# Patient Record
Sex: MALE | Race: WHITE | NOT HISPANIC OR LATINO | URBAN - METROPOLITAN AREA
[De-identification: names, ages, dates, MRNs, and addresses within clinical notes are randomized per-mention and may not be internally consistent; named-entity substitution may affect disease eponyms.]

---

## 2017-02-14 ENCOUNTER — IMPORTED ENCOUNTER (OUTPATIENT)
Dept: URBAN - METROPOLITAN AREA CLINIC 50 | Facility: CLINIC | Age: 77
End: 2017-02-14

## 2017-05-16 ENCOUNTER — IMPORTED ENCOUNTER (OUTPATIENT)
Dept: URBAN - METROPOLITAN AREA CLINIC 50 | Facility: CLINIC | Age: 77
End: 2017-05-16

## 2017-11-21 ENCOUNTER — IMPORTED ENCOUNTER (OUTPATIENT)
Dept: URBAN - METROPOLITAN AREA CLINIC 50 | Facility: CLINIC | Age: 77
End: 2017-11-21

## 2017-11-21 NOTE — PATIENT DISCUSSION
"""Continue Artificial tears both eyes two - four times a day. "" ""Continue Zaditor both eyes twice a day. PRN"" ""Continue Cool Compresses both eyes as needed.  """

## 2018-03-01 ENCOUNTER — IMPORTED ENCOUNTER (OUTPATIENT)
Dept: URBAN - METROPOLITAN AREA CLINIC 50 | Facility: CLINIC | Age: 78
End: 2018-03-01

## 2018-05-29 ENCOUNTER — IMPORTED ENCOUNTER (OUTPATIENT)
Dept: URBAN - METROPOLITAN AREA CLINIC 50 | Facility: CLINIC | Age: 78
End: 2018-05-29

## 2018-11-20 ENCOUNTER — IMPORTED ENCOUNTER (OUTPATIENT)
Dept: URBAN - METROPOLITAN AREA CLINIC 50 | Facility: CLINIC | Age: 78
End: 2018-11-20

## 2019-07-01 ENCOUNTER — IMPORTED ENCOUNTER (OUTPATIENT)
Dept: URBAN - METROPOLITAN AREA CLINIC 50 | Facility: CLINIC | Age: 79
End: 2019-07-01

## 2019-07-02 ENCOUNTER — IMPORTED ENCOUNTER (OUTPATIENT)
Dept: URBAN - METROPOLITAN AREA CLINIC 50 | Facility: CLINIC | Age: 79
End: 2019-07-02

## 2019-08-01 ENCOUNTER — IMPORTED ENCOUNTER (OUTPATIENT)
Dept: URBAN - METROPOLITAN AREA CLINIC 50 | Facility: CLINIC | Age: 79
End: 2019-08-01

## 2020-01-02 ENCOUNTER — IMPORTED ENCOUNTER (OUTPATIENT)
Dept: URBAN - METROPOLITAN AREA CLINIC 50 | Facility: CLINIC | Age: 80
End: 2020-01-02

## 2020-06-16 ENCOUNTER — IMPORTED ENCOUNTER (OUTPATIENT)
Dept: URBAN - METROPOLITAN AREA CLINIC 50 | Facility: CLINIC | Age: 80
End: 2020-06-16

## 2020-12-16 ENCOUNTER — IMPORTED ENCOUNTER (OUTPATIENT)
Dept: URBAN - METROPOLITAN AREA CLINIC 50 | Facility: CLINIC | Age: 80
End: 2020-12-16

## 2020-12-16 NOTE — PATIENT DISCUSSION
"""Continue Artificial tears both eyes twice a day ."" ""Continue allergy drops both eyes as needed . """

## 2021-03-01 ENCOUNTER — IMPORTED ENCOUNTER (OUTPATIENT)
Dept: URBAN - METROPOLITAN AREA CLINIC 50 | Facility: CLINIC | Age: 81
End: 2021-03-01

## 2021-04-18 ASSESSMENT — VISUAL ACUITY
OD_CC: J1+
OD_CC: 20/30
OD_CC: 20/40-
OD_CC: 20/50
OS_CC: J2
OS_CC: 20/25-
OS_CC: J1+@ 16 IN
OD_CC: J2
OD_CC: J1+
OD_CC: J1+@ 16 IN
OS_CC: J1+
OD_CC: 20/25-2
OS_CC: 20/25-
OD_PH: 20/30
OD_CC: 20/50
OS_CC: 20/25
OS_CC: 20/30-
OS_CC: 20/40
OS_CC: 20/30
OS_CC: J1
OD_CC: J1
OD_CC: 20/60
OS_CC: J2@ 16 IN
OS_CC: 20/25-
OD_CC: J1
OD_CC: 20/30
OD_CC: J2@ 16 IN
OS_CC: J1
OS_CC: 20/30
OS_CC: 20/25
OS_CC: J1+

## 2021-04-18 ASSESSMENT — TONOMETRY
OD_IOP_MMHG: 20
OD_IOP_MMHG: 20
OD_IOP_MMHG: 18
OS_IOP_MMHG: 18
OS_IOP_MMHG: 19
OS_IOP_MMHG: 18
OD_IOP_MMHG: 20
OS_IOP_MMHG: 20
OS_IOP_MMHG: 23
OD_IOP_MMHG: 18
OD_IOP_MMHG: 20
OS_IOP_MMHG: 18
OS_IOP_MMHG: 20
OD_IOP_MMHG: 20
OS_IOP_MMHG: 19
OS_IOP_MMHG: 18

## 2021-09-02 ENCOUNTER — PROBLEM (OUTPATIENT)
Dept: URBAN - METROPOLITAN AREA CLINIC 49 | Facility: CLINIC | Age: 81
End: 2021-09-02

## 2021-09-02 DIAGNOSIS — H53.8: ICD-10-CM

## 2021-09-02 DIAGNOSIS — H35.3232: ICD-10-CM

## 2021-09-02 DIAGNOSIS — H04.123: ICD-10-CM

## 2021-09-02 DIAGNOSIS — H35.373: ICD-10-CM

## 2021-09-02 DIAGNOSIS — H43.813: ICD-10-CM

## 2021-09-02 PROCEDURE — 92012 INTRM OPH EXAM EST PATIENT: CPT

## 2021-09-02 PROCEDURE — 92134 CPTRZ OPH DX IMG PST SGM RTA: CPT

## 2021-09-02 ASSESSMENT — VISUAL ACUITY
OD_CC: 20/60 ECC
OS_CC: 20/40+2

## 2021-09-02 ASSESSMENT — TONOMETRY
OS_IOP_MMHG: 18
OD_IOP_MMHG: 18

## 2021-09-02 NOTE — PATIENT DISCUSSION
Stable. Discussed AMD and  risk of vision loss. Discussed AREDS 2 vitamins, eating dark green leafy vegetables, wearing UV sunglasses, and BP control. Patient to call if vision distorts/blurs or any [place]ms[place] grid changes.

## 2021-09-02 NOTE — PATIENT DISCUSSION
recommend patient have hvf 30-2 soon to see if there is any defect from stroke or something else like glaucoma.

## 2021-09-03 ENCOUNTER — DIAGNOSTICS - HVF (OUTPATIENT)
Dept: URBAN - METROPOLITAN AREA CLINIC 49 | Facility: CLINIC | Age: 81
End: 2021-09-03

## 2021-09-03 DIAGNOSIS — H40.052: ICD-10-CM

## 2021-09-03 PROCEDURE — 92083 EXTENDED VISUAL FIELD XM: CPT

## 2021-09-03 NOTE — PATIENT DISCUSSION
discussed with patient that he needs to continue with [facility] for treatment.

## 2023-04-06 ENCOUNTER — COMPREHENSIVE EXAM (OUTPATIENT)
Dept: URBAN - METROPOLITAN AREA CLINIC 49 | Facility: CLINIC | Age: 83
End: 2023-04-06

## 2023-04-06 DIAGNOSIS — H04.123: ICD-10-CM

## 2023-04-06 DIAGNOSIS — H43.813: ICD-10-CM

## 2023-04-06 DIAGNOSIS — H35.3232: ICD-10-CM

## 2023-04-06 DIAGNOSIS — H35.373: ICD-10-CM

## 2023-04-06 PROCEDURE — 92015 DETERMINE REFRACTIVE STATE: CPT

## 2023-04-06 PROCEDURE — 92014 COMPRE OPH EXAM EST PT 1/>: CPT

## 2023-04-06 PROCEDURE — 92134 CPTRZ OPH DX IMG PST SGM RTA: CPT

## 2023-04-06 RX ORDER — SODIUM CHLORIDE 50 MG/ML: 1 SOLUTION OPHTHALMIC ONCE A DAY

## 2023-04-06 ASSESSMENT — KERATOMETRY
OS_AXISANGLE_DEGREES: 140
OD_AXISANGLE2_DEGREES: 90
OS_K1POWER_DIOPTERS: 43.00
OD_K2POWER_DIOPTERS: 44.75
OS_K2POWER_DIOPTERS: 44.00
OD_K1POWER_DIOPTERS: 44.75
OD_AXISANGLE_DEGREES: 0
OS_AXISANGLE2_DEGREES: 50

## 2023-04-06 ASSESSMENT — VISUAL ACUITY
OD_PH: 20/70
OD_CC: 20/100-1
OU_CC: J1@16
OS_CC: 20/50-2
OS_PH: 20/40

## 2023-04-06 ASSESSMENT — TONOMETRY
OS_IOP_MMHG: 19
OD_IOP_MMHG: 19

## 2024-01-24 ENCOUNTER — ESTABLISHED PATIENT (OUTPATIENT)
Dept: URBAN - METROPOLITAN AREA CLINIC 49 | Facility: LOCATION | Age: 84
End: 2024-01-24

## 2024-01-24 DIAGNOSIS — H04.123: ICD-10-CM

## 2024-01-24 DIAGNOSIS — H35.3232: ICD-10-CM

## 2024-01-24 DIAGNOSIS — H43.813: ICD-10-CM

## 2024-01-24 DIAGNOSIS — H35.373: ICD-10-CM

## 2024-01-24 PROCEDURE — 92134 CPTRZ OPH DX IMG PST SGM RTA: CPT

## 2024-01-24 PROCEDURE — 99214 OFFICE O/P EST MOD 30 MIN: CPT

## 2024-01-24 ASSESSMENT — KERATOMETRY
OS_K1POWER_DIOPTERS: 43.00
OD_K1POWER_DIOPTERS: 44.75
OS_AXISANGLE2_DEGREES: 50
OD_AXISANGLE2_DEGREES: 90
OD_AXISANGLE_DEGREES: 0
OD_K2POWER_DIOPTERS: 44.75
OS_K2POWER_DIOPTERS: 44.00
OS_AXISANGLE_DEGREES: 140

## 2024-01-24 ASSESSMENT — TONOMETRY
OD_IOP_MMHG: 16
OS_IOP_MMHG: 22

## 2024-01-24 ASSESSMENT — VISUAL ACUITY
OD_PH: 20/60
OS_CC: 20/40
OD_CC: 20/100+2

## 2025-01-23 ENCOUNTER — COMPREHENSIVE EXAM (OUTPATIENT)
Age: 85
End: 2025-01-23

## 2025-01-23 DIAGNOSIS — H04.123: ICD-10-CM

## 2025-01-23 DIAGNOSIS — H40.053: ICD-10-CM

## 2025-01-23 DIAGNOSIS — H43.813: ICD-10-CM

## 2025-01-23 DIAGNOSIS — H02.831: ICD-10-CM

## 2025-01-23 DIAGNOSIS — H35.3232: ICD-10-CM

## 2025-01-23 DIAGNOSIS — H35.721: ICD-10-CM

## 2025-01-23 DIAGNOSIS — H02.834: ICD-10-CM

## 2025-01-23 DIAGNOSIS — H35.373: ICD-10-CM

## 2025-01-23 DIAGNOSIS — H52.4: ICD-10-CM

## 2025-01-23 PROCEDURE — 92134 CPTRZ OPH DX IMG PST SGM RTA: CPT

## 2025-01-23 PROCEDURE — 76514 ECHO EXAM OF EYE THICKNESS: CPT

## 2025-01-23 PROCEDURE — 99214 OFFICE O/P EST MOD 30 MIN: CPT
